# Patient Record
Sex: FEMALE | Race: BLACK OR AFRICAN AMERICAN | URBAN - METROPOLITAN AREA
[De-identification: names, ages, dates, MRNs, and addresses within clinical notes are randomized per-mention and may not be internally consistent; named-entity substitution may affect disease eponyms.]

---

## 2020-06-30 PROBLEM — N19 KIDNEY FAILURE: Status: ACTIVE | Noted: 2020-06-30

## 2020-06-30 PROBLEM — Z99.2 ESRD ON DIALYSIS (HCC): Status: ACTIVE | Noted: 2020-06-30

## 2020-06-30 PROBLEM — N17.9 AKI (ACUTE KIDNEY INJURY) (HCC): Status: ACTIVE | Noted: 2020-06-17

## 2020-06-30 PROBLEM — N18.6 ESRD ON DIALYSIS (HCC): Status: ACTIVE | Noted: 2020-06-30

## 2020-06-30 PROBLEM — I10 ESSENTIAL HYPERTENSION: Status: ACTIVE | Noted: 2020-06-30

## 2020-06-30 PROBLEM — N18.9 CHRONIC RENAL FAILURE: Status: ACTIVE | Noted: 2020-06-30

## 2020-07-02 PROBLEM — E43 SEVERE MALNUTRITION (HCC): Status: ACTIVE | Noted: 2020-07-02

## 2020-07-04 PROBLEM — R78.81 MSSA BACTEREMIA: Status: ACTIVE | Noted: 2020-07-04

## 2020-07-04 PROBLEM — B95.61 MSSA BACTEREMIA: Status: ACTIVE | Noted: 2020-07-04

## 2020-07-04 PROBLEM — M00.9 PYOGENIC ARTHRITIS OF MULTIPLE SITES (HCC): Status: ACTIVE | Noted: 2020-07-04

## 2020-08-01 PROBLEM — R10.9 INTRACTABLE ABDOMINAL PAIN: Status: ACTIVE | Noted: 2020-08-01

## 2020-08-01 PROBLEM — I31.39 PERICARDIAL EFFUSION: Status: ACTIVE | Noted: 2020-08-01

## 2020-08-01 PROBLEM — R10.84 GENERALIZED ABDOMINAL PAIN: Status: ACTIVE | Noted: 2020-08-01

## 2020-08-01 PROBLEM — E87.6 HYPOKALEMIA: Status: ACTIVE | Noted: 2020-08-01

## 2020-08-03 PROBLEM — E43 SEVERE MALNUTRITION (HCC): Status: ACTIVE | Noted: 2020-08-03

## 2020-08-03 PROBLEM — K86.89 PANCREATIC MASS: Status: ACTIVE | Noted: 2020-08-03

## 2021-03-02 PROBLEM — I16.0 HYPERTENSIVE URGENCY: Status: ACTIVE | Noted: 2021-03-02

## 2021-03-09 PROBLEM — I10 HYPERTENSION: Status: ACTIVE | Noted: 2021-03-09

## 2021-04-06 PROBLEM — R10.9 ABDOMINAL PAIN: Status: ACTIVE | Noted: 2021-04-06

## 2021-04-08 PROBLEM — R10.84 DIFFUSE ABDOMINAL PAIN: Status: ACTIVE | Noted: 2021-04-08

## 2021-04-28 PROBLEM — D64.9 ANEMIA: Status: ACTIVE | Noted: 2021-04-28

## 2021-05-08 PROBLEM — Z90.411 HISTORY OF PARTIAL PANCREATECTOMY: Status: ACTIVE | Noted: 2021-02-18

## 2021-05-08 PROBLEM — R26.2 DISABILITY OF WALKING: Status: ACTIVE | Noted: 2021-05-08

## 2021-05-08 PROBLEM — E44.1 MALNUTRITION OF MILD DEGREE (HCC): Status: ACTIVE | Noted: 2020-08-17

## 2021-05-08 PROBLEM — R18.8 INTRA-ABDOMINAL FLUID COLLECTION: Status: ACTIVE | Noted: 2021-01-29

## 2021-05-08 PROBLEM — G43.009 MIGRAINE WITHOUT AURA OR STATUS MIGRAINOSUS: Status: ACTIVE | Noted: 2021-04-03

## 2021-05-08 PROBLEM — I11.9 BENIGN HYPERTENSIVE HEART DISEASE: Status: ACTIVE | Noted: 2020-06-30

## 2021-05-08 PROBLEM — Z93.1 GASTROSTOMY PRESENT (HCC): Status: ACTIVE | Noted: 2021-05-08

## 2021-05-08 PROBLEM — R10.13 EPIGASTRIC ABDOMINAL PAIN: Status: ACTIVE | Noted: 2021-02-17

## 2021-05-08 PROBLEM — G44.209 TENSION TYPE HEADACHE: Status: ACTIVE | Noted: 2021-04-03

## 2021-05-08 PROBLEM — R06.00 DYSPNEA: Status: ACTIVE | Noted: 2021-02-23

## 2021-05-08 PROBLEM — I50.9 NEW ONSET OF CONGESTIVE HEART FAILURE (HCC): Status: ACTIVE | Noted: 2021-05-08

## 2021-05-08 PROBLEM — E88.09 PSEUDOCHOLINESTERASE DEFICIENCY: Status: ACTIVE | Noted: 2020-10-15

## 2021-05-08 PROBLEM — R62.7 FAILURE TO THRIVE IN ADULT: Status: ACTIVE | Noted: 2021-03-13

## 2021-05-08 PROBLEM — J90 PLEURAL EFFUSION: Status: ACTIVE | Noted: 2020-09-18

## 2021-05-08 PROBLEM — D57.3 SICKLE CELL TRAIT (HCC): Status: ACTIVE | Noted: 2020-09-18

## 2021-05-08 PROBLEM — G89.18 POSTOPERATIVE PAIN: Status: ACTIVE | Noted: 2021-02-07

## 2021-05-08 PROBLEM — A41.9 SEPSIS (HCC): Status: ACTIVE | Noted: 2020-09-18

## 2021-05-08 PROBLEM — R56.9 SEIZURE (HCC): Status: ACTIVE | Noted: 2021-01-28

## 2021-05-08 PROBLEM — R63.4 WEIGHT LOSS: Status: ACTIVE | Noted: 2020-09-18

## 2021-05-08 PROBLEM — K21.9 GASTROESOPHAGEAL REFLUX DISEASE: Status: ACTIVE | Noted: 2020-09-18

## 2021-05-08 PROBLEM — L02.213 ABSCESS OF CHEST WALL: Status: ACTIVE | Noted: 2020-07-20

## 2021-05-08 PROBLEM — E55.9 VITAMIN D DEFICIENCY: Status: ACTIVE | Noted: 2020-09-18

## 2021-05-08 PROBLEM — F17.200 NICOTINE USE DISORDER: Status: ACTIVE | Noted: 2021-01-12

## 2021-05-08 PROBLEM — Z99.2 DEPENDENCE ON RENAL DIALYSIS (HCC): Status: ACTIVE | Noted: 2021-05-08

## 2021-05-08 PROBLEM — Z53.29 LEFT AGAINST MEDICAL ADVICE: Status: ACTIVE | Noted: 2021-04-03

## 2021-05-08 PROBLEM — M65.142 SUPPURATIVE TENOSYNOVITIS OF FLEXOR TENDON OF LEFT HAND: Status: ACTIVE | Noted: 2020-07-20

## 2021-05-31 PROBLEM — I16.1 HYPERTENSIVE EMERGENCY: Status: ACTIVE | Noted: 2021-05-31

## 2021-10-19 PROBLEM — R78.81 POSITIVE BLOOD CULTURE: Status: ACTIVE | Noted: 2021-10-19

## 2021-11-09 PROBLEM — I82.210 SUPERIOR VENA CAVA THROMBOSIS (HCC): Status: ACTIVE | Noted: 2021-11-09

## 2021-11-21 PROBLEM — R77.8 ELEVATED TROPONIN: Status: ACTIVE | Noted: 2021-11-21

## 2021-12-17 PROBLEM — I51.3: Status: ACTIVE | Noted: 2021-12-17

## 2021-12-21 PROBLEM — R77.8 ELEVATED TROPONIN: Status: RESOLVED | Noted: 2021-11-21 | Resolved: 2021-12-21

## 2022-01-05 PROBLEM — R50.9 FEVER AND CHILLS: Status: ACTIVE | Noted: 2022-01-05

## 2022-01-17 PROBLEM — M00.9 SEPTIC ARTHRITIS (HCC): Status: ACTIVE | Noted: 2022-01-17

## 2022-06-06 PROBLEM — N17.9 ACUTE KIDNEY INJURY (HCC): Status: ACTIVE | Noted: 2022-06-06

## 2022-07-17 PROBLEM — R10.10 UPPER ABDOMINAL PAIN: Status: ACTIVE | Noted: 2022-07-17

## 2022-07-18 PROBLEM — K29.90 GASTRITIS AND DUODENITIS: Status: ACTIVE | Noted: 2022-07-18

## 2022-07-21 PROBLEM — R79.89 ELEVATED LIVER FUNCTION TESTS: Status: ACTIVE | Noted: 2022-07-21

## 2022-10-24 PROBLEM — R77.8 ELEVATED TROPONIN: Status: ACTIVE | Noted: 2022-10-24

## 2022-10-25 PROBLEM — E43 SEVERE MALNUTRITION (HCC): Chronic | Status: ACTIVE | Noted: 2022-10-25

## 2022-10-28 PROBLEM — K85.90 PANCREATITIS, UNSPECIFIED PANCREATITIS TYPE: Status: ACTIVE | Noted: 2022-10-28

## 2022-11-23 PROBLEM — R77.8 ELEVATED TROPONIN: Status: RESOLVED | Noted: 2022-10-24 | Resolved: 2022-11-23

## 2022-12-05 ENCOUNTER — APPOINTMENT (OUTPATIENT)
Dept: GENERAL RADIOLOGY | Age: 32
End: 2022-12-05
Payer: COMMERCIAL

## 2022-12-05 ENCOUNTER — HOSPITAL ENCOUNTER (EMERGENCY)
Age: 32
Discharge: HOME | End: 2022-12-05
Attending: EMERGENCY MEDICINE
Payer: COMMERCIAL

## 2022-12-05 VITALS
HEIGHT: 62 IN | TEMPERATURE: 98.3 F | RESPIRATION RATE: 17 BRPM | WEIGHT: 103 LBS | OXYGEN SATURATION: 97 % | BODY MASS INDEX: 18.95 KG/M2 | HEART RATE: 96 BPM | DIASTOLIC BLOOD PRESSURE: 106 MMHG | SYSTOLIC BLOOD PRESSURE: 161 MMHG

## 2022-12-05 DIAGNOSIS — D57.00 SICKLE CELL PAIN CRISIS (HCC): Primary | ICD-10-CM

## 2022-12-05 LAB
ABSOLUTE EOS #: 0.27 K/UL (ref 0–0.4)
ABSOLUTE LYMPH #: 0.82 K/UL (ref 1–4.8)
ABSOLUTE MONO #: 0.69 K/UL (ref 0.1–1.3)
ALBUMIN SERPL-MCNC: 4 G/DL (ref 3.5–5.2)
ALP BLD-CCNC: 161 U/L (ref 35–104)
ALT SERPL-CCNC: 8 U/L (ref 5–33)
ANION GAP SERPL CALCULATED.3IONS-SCNC: 15 MMOL/L (ref 9–17)
AST SERPL-CCNC: 16 U/L
BASOPHILS # BLD: 0 % (ref 0–2)
BASOPHILS ABSOLUTE: 0 K/UL (ref 0–0.2)
BILIRUB SERPL-MCNC: 0.2 MG/DL (ref 0.3–1.2)
BUN BLDV-MCNC: 50 MG/DL (ref 6–20)
CALCIUM SERPL-MCNC: 8.3 MG/DL (ref 8.6–10.4)
CHLORIDE BLD-SCNC: 104 MMOL/L (ref 98–107)
CO2: 15 MMOL/L (ref 20–31)
CREAT SERPL-MCNC: 2.5 MG/DL (ref 0.5–0.9)
EOSINOPHILS RELATIVE PERCENT: 2 % (ref 0–4)
GFR SERPL CREATININE-BSD FRML MDRD: 26 ML/MIN/1.73M2
GLUCOSE BLD-MCNC: 122 MG/DL (ref 70–99)
HCG QUALITATIVE: NEGATIVE
HCT VFR BLD CALC: 38.8 % (ref 36–46)
HEMOGLOBIN: 12.8 G/DL (ref 12–16)
LYMPHOCYTES # BLD: 6 % (ref 24–44)
MAGNESIUM: 1.9 MG/DL (ref 1.6–2.6)
MCH RBC QN AUTO: 28.2 PG (ref 26–34)
MCHC RBC AUTO-ENTMCNC: 33 G/DL (ref 31–37)
MCV RBC AUTO: 85.4 FL (ref 80–100)
MONOCYTES # BLD: 5 % (ref 1–7)
MORPHOLOGY: ABNORMAL
PDW BLD-RTO: 16.9 % (ref 11.5–14.9)
PLATELET # BLD: 584 K/UL (ref 150–450)
PMV BLD AUTO: 8.4 FL (ref 6–12)
POTASSIUM SERPL-SCNC: 4.7 MMOL/L (ref 3.7–5.3)
RBC # BLD: 4.54 M/UL (ref 4–5.2)
REASON FOR REJECTION: NORMAL
SEG NEUTROPHILS: 87 % (ref 36–66)
SEGMENTED NEUTROPHILS ABSOLUTE COUNT: 11.92 K/UL (ref 1.3–9.1)
SODIUM BLD-SCNC: 134 MMOL/L (ref 135–144)
TOTAL PROTEIN: 8.1 G/DL (ref 6.4–8.3)
TROPONIN, HIGH SENSITIVITY: 43 NG/L (ref 0–14)
TROPONIN, HIGH SENSITIVITY: 51 NG/L (ref 0–14)
WBC # BLD: 13.7 K/UL (ref 3.5–11)
ZZ NTE CLEAN UP: ORDERED TEST: NORMAL
ZZ NTE WITH NAME CLEAN UP: SPECIMEN SOURCE: NORMAL

## 2022-12-05 PROCEDURE — 6370000000 HC RX 637 (ALT 250 FOR IP): Performed by: EMERGENCY MEDICINE

## 2022-12-05 PROCEDURE — 84484 ASSAY OF TROPONIN QUANT: CPT

## 2022-12-05 PROCEDURE — 96376 TX/PRO/DX INJ SAME DRUG ADON: CPT

## 2022-12-05 PROCEDURE — 36415 COLL VENOUS BLD VENIPUNCTURE: CPT

## 2022-12-05 PROCEDURE — 71045 X-RAY EXAM CHEST 1 VIEW: CPT

## 2022-12-05 PROCEDURE — 84703 CHORIONIC GONADOTROPIN ASSAY: CPT

## 2022-12-05 PROCEDURE — 93005 ELECTROCARDIOGRAM TRACING: CPT | Performed by: EMERGENCY MEDICINE

## 2022-12-05 PROCEDURE — 99285 EMERGENCY DEPT VISIT HI MDM: CPT

## 2022-12-05 PROCEDURE — 80053 COMPREHEN METABOLIC PANEL: CPT

## 2022-12-05 PROCEDURE — 83735 ASSAY OF MAGNESIUM: CPT

## 2022-12-05 PROCEDURE — 6360000002 HC RX W HCPCS: Performed by: EMERGENCY MEDICINE

## 2022-12-05 PROCEDURE — 96361 HYDRATE IV INFUSION ADD-ON: CPT

## 2022-12-05 PROCEDURE — 2580000003 HC RX 258: Performed by: EMERGENCY MEDICINE

## 2022-12-05 PROCEDURE — 85025 COMPLETE CBC W/AUTO DIFF WBC: CPT

## 2022-12-05 PROCEDURE — 96374 THER/PROPH/DIAG INJ IV PUSH: CPT

## 2022-12-05 RX ORDER — DIPHENHYDRAMINE HCL 25 MG
50 TABLET ORAL ONCE
Status: COMPLETED | OUTPATIENT
Start: 2022-12-05 | End: 2022-12-05

## 2022-12-05 RX ORDER — 0.9 % SODIUM CHLORIDE 0.9 %
1000 INTRAVENOUS SOLUTION INTRAVENOUS ONCE
Status: COMPLETED | OUTPATIENT
Start: 2022-12-05 | End: 2022-12-05

## 2022-12-05 RX ADMIN — HYDROMORPHONE HYDROCHLORIDE 1 MG: 1 INJECTION, SOLUTION INTRAMUSCULAR; INTRAVENOUS; SUBCUTANEOUS at 15:49

## 2022-12-05 RX ADMIN — HYDROMORPHONE HYDROCHLORIDE 1 MG: 1 INJECTION, SOLUTION INTRAMUSCULAR; INTRAVENOUS; SUBCUTANEOUS at 13:35

## 2022-12-05 RX ADMIN — SODIUM CHLORIDE 1000 ML: 9 INJECTION, SOLUTION INTRAVENOUS at 15:49

## 2022-12-05 RX ADMIN — SODIUM CHLORIDE 1000 ML: 9 INJECTION, SOLUTION INTRAVENOUS at 13:41

## 2022-12-05 RX ADMIN — DIPHENHYDRAMINE HYDROCHLORIDE 50 MG: 25 TABLET ORAL at 13:38

## 2022-12-05 ASSESSMENT — PAIN DESCRIPTION - PAIN TYPE: TYPE: ACUTE PAIN

## 2022-12-05 ASSESSMENT — PAIN SCALES - GENERAL
PAINLEVEL_OUTOF10: 0
PAINLEVEL_OUTOF10: 9
PAINLEVEL_OUTOF10: 8
PAINLEVEL_OUTOF10: 10
PAINLEVEL_OUTOF10: 10

## 2022-12-05 ASSESSMENT — PAIN DESCRIPTION - LOCATION
LOCATION: GENERALIZED
LOCATION: GENERALIZED

## 2022-12-05 ASSESSMENT — ENCOUNTER SYMPTOMS
COUGH: 0
VOMITING: 0
NAUSEA: 0

## 2022-12-05 ASSESSMENT — PAIN - FUNCTIONAL ASSESSMENT: PAIN_FUNCTIONAL_ASSESSMENT: 0-10

## 2022-12-05 NOTE — ED TRIAGE NOTES
Mode of arrival (squad #, walk in, police, etc) : walk-in        Chief complaint(s): sickle cell pain crisis        Arrival Note (brief scenario, treatment PTA, etc). : Pt reports being in sickle cell pain crisis x2 hours. Pt reports pain over entire body. C= \"Have you ever felt that you should Cut down on your drinking? \"  No  A= \"Have people Annoyed you by criticizing your drinking? \"  No  G= \"Have you ever felt bad or Guilty about your drinking? \"  No  E= \"Have you ever had a drink as an Eye-opener first thing in the morning to steady your nerves or to help a hangover? \"  No      Deferred []      Reason for deferring: N/A    *If yes to two or more: probable alcohol abuse. *

## 2022-12-05 NOTE — ED PROVIDER NOTES
[address]  EMERGENCY DEPARTMENT ENCOUNTER      Pt Name: Nalani Ganser  MRN: [id number]  [place] 1990  Date of evaluation: 12/5/22      CHIEF COMPLAINT       Chief Complaint   Patient presents with    Sickle Cell Pain Crisis     HISTORY OF PRESENT ILLNESS   HPI 33 y.o. female presents with c/o body pain. The patient reports that she was outside today doing a driving test required for applying for a job. She says that she was really cold and then began having diffuse body and muscle pain. Her she says her fingers were turning white and very painful. She came to the emergency department. Pain is severe in severity, constant in course, present throughout the day. Patient has sickle cell disease, she also has chronic kidney disease, she says that she was taken off of dialysis and she has been urinating normally. She denies any cough runny nose or shortness of breath. She does report chest pain across her bilateral chest.  No vomiting no diarrhea. REVIEW OF SYSTEMS       Review of Systems   Constitutional:  Negative for chills and fever. HENT:  Negative for congestion. Eyes:  Negative for visual disturbance. Respiratory:  Negative for cough. Cardiovascular:  Positive for chest pain. Gastrointestinal:  Negative for nausea and vomiting. Genitourinary:  Negative for difficulty urinating. Musculoskeletal:  Positive for arthralgias and myalgias. Skin:  Negative for rash. Neurological:  Negative for headaches. Psychiatric/Behavioral:  Negative for confusion.       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    Hypertension     Pleural effusion     PNA (pneumonia)     Sickle cell trait ([facility] [address]        SURGICAL HISTORY       Past Surgical History:   Procedure Laterality Date    ENDOSCOPY, COLON, DIAGNOSTIC 2020    ENDOSCOPY, COLON, DIAGNOSTIC  2022    HAND SURGERY      Left hand due to infection    PANCREAS SURGERY      SHOULDER SURGERY      Left back shoulder due to infection    SPLENECTOMY         CURRENT MEDICATIONS       Previous Medications    CARVEDILOL (COREG) 25 MG TABLET    Take 25 mg by mouth 2 times daily (with meals)    CLONIDINE (CATAPRES) 0.3 MG TABLET    Take 0.2 mg by mouth in the morning and 0.2 mg at noon and 0.2 mg in the evening. 0.2 mg every 8 hrs. HYOSCYAMINE (ANASPAZ;LEVSIN) 125 MCG TABLET    Take 125 mcg by mouth every 8 hours as needed for Cramping    ISOSORBIDE MONONITRATE (IMDUR) 60 MG EXTENDED RELEASE TABLET    Take 60 mg by mouth in the morning. LACTOBACILLUS (ACIDOPHILUS) CAPS CAPSULE    Take 1 capsule by mouth daily    METOCLOPRAMIDE (REGLAN) 5 MG TABLET    Take 5 mg by mouth 4 times daily as needed (headache or nausea)    NIFEDIPINE (PROCARDIA XL) 90 MG EXTENDED RELEASE TABLET    Take 90 mg by mouth 2 times daily     PANTOPRAZOLE (PROTONIX) 40 MG TABLET    Take 40 mg by mouth 2 times daily       ALLERGIES     is allergic to succinylcholine, amino acids, aminosyn, anidulafungin, cefazolin, chlorhexidine, lisinopril, morphine, and vancomycin. FAMILY HISTORY     She indicated that the status of her neg hx is unknown. SOCIAL HISTORY      reports that she has been smoking cigarettes. She has been smoking an average of .5 packs per day. She has never used smokeless tobacco. She reports that she does not drink alcohol and does not use drugs.     PHYSICAL EXAM     INITIAL VITALS: BP (!) 182/118   Pulse (!) 112   Temp 98.3 °F (36.8 °C) (Oral)   Resp 29   Ht 5' 2\" (1.575 m)   Wt 103 lb (46.7 kg)   SpO2 99%   BMI 18.84 kg/m²   Gen: Crying, tearful, rocking back and forth in bed  Head: Normocephalic, atraumatic  Eye: Pupils equal round reactive to light, no conjunctivitis  ENT: MMM  Neck: No JVD  Heart: Tachycardic with a regular rhythm no murmurs  Lungs: Clear to auscultation bilaterally, no respiratory distress  Chest wall: No crepitus, no tenderness palpation  Abdomen: Soft, nontender, nondistended, with no peritoneal signs  Neurologic: Patient is alert and oriented x3, motor and sensation is intact in all 4 extremities, fluent speech  Extremities: No edema, appropriate capillary refill    MEDICAL DECISION MAKING:     [facility]  And Emergency Department course  33 y.o. female with sickle cell disease presenting with acute muscle aches and body pain. Suspect a sickle cell pain crisis secondary to cold exposure. The patient's hemoglobin was checked and there is no acute anemia. Chest x-ray was obtained and shows no sign of any pulmonary infiltrates and so I doubt that she has acute chest syndrome. Given her chest pain we ruled out acute coronary syndrome, she does have a mild troponin elevation but this is not significantly elevating, I reviewed her outside medical records and it appears she has a chronic troponin elevation. The patient has chronic kidney disease and her potassium is not elevated. Patient was reassessed after warm IV fluids and analgesics. She says that she is feeling better. She is ready to go home. D/w pt the results, treatment plan, warning precautions for prompt ED return and importance of close OP FU, she verbalizes understanding and agrees with the treatment plan. DIAGNOSTIC RESULTS     EKG: All EKG's are interpreted by the Emergency Department Physician who either signs or Co-signs this chart in the absence of a cardiologist.    EKG shows a sinus tachycardia rhythm. HR is 101, , QRS 76, , no MURIEL, No STD, TWI, the axis is normal.      RADIOLOGY:All plain film, CT, MRI, and formal ultrasound images (except ED bedside ultrasound) are read by the radiologist and the images and interpretations are directly viewed by the emergency physician. XR CHEST PORTABLE   Final Result   Cardiomegaly without acute pulmonary process.              LABS: All lab results were reviewed by myself, and all abnormals are listed below. Labs Reviewed   CBC WITH AUTO DIFFERENTIAL - Abnormal; Notable for the following components:       Result Value    WBC 13.7 (*)     RDW 16.9 (*)     Platelets 399 (*)     Seg Neutrophils 87 (*)     Lymphocytes 6 (*)     Segs Absolute 11.92 (*)     Absolute Lymph # 0.82 (*)     All other components within normal limits   TROPONIN - Abnormal; Notable for the following components:    Troponin, High Sensitivity 51 (*)     All other components within normal limits   COMPREHENSIVE METABOLIC PANEL - Abnormal; Notable for the following components:    Glucose 122 (*)     BUN 50 (*)     Creatinine 2.50 (*)     Est, Glom Filt Rate 26 (*)     Calcium 8.3 (*)     Sodium 134 (*)     CO2 15 (*)     Alkaline Phosphatase 161 (*)     Total Bilirubin 0.2 (*)     All other components within normal limits   TROPONIN - Abnormal; Notable for the following components:    Troponin, High Sensitivity 43 (*)     All other components within normal limits   HCG, SERUM, QUALITATIVE   SPECIMEN REJECTION   MAGNESIUM       EMERGENCY DEPARTMENT COURSE:   Vitals:    Vitals:    12/05/22 1347 12/05/22 1352 12/05/22 1430 12/05/22 1530   BP: (!) 182/121  (!) 177/143 (!) 182/118   Pulse: (!) 121 (!) 117 (!) 118 (!) 112   Resp: 25 19 28 29   Temp:       TempSrc:       SpO2: 97% 99% 100% 99%   Weight:       Height:           The patient was given the following medications while in the emergency department:  Orders Placed This Encounter   Medications    0.9 % sodium chloride bolus    HYDROmorphone (DILAUDID) injection 1 mg    diphenhydrAMINE (BENADRYL) tablet 50 mg    HYDROmorphone (DILAUDID) injection 1 mg    0.9 % sodium chloride bolus     -------------------------  CRITICAL CARE:   CONSULTS: None  PROCEDURES: Procedures     FINAL IMPRESSION      1.  Sickle cell pain crisis [facility])          DISPOSITION/PLAN   DISPOSITION Decision To Discharge 12/05/2022 04:04:41 PM      PATIENT REFERRED TO:  Nu Gonzales MD  [address] [place] [address]  [phone]    In 2 days      [facility] ED  Critical access hospital [facility] [address]  [address] [facility]  [phone]    If symptoms worsen      DISCHARGE MEDICATIONS:  New Prescriptions    No medications on file         Torri Sanchez MD  Attending Emergency Physician                      Torri Sanchez MD  12/05/22 0787

## 2022-12-06 LAB
EKG ATRIAL RATE: 101 BPM
EKG P AXIS: 58 DEGREES
EKG P-R INTERVAL: 124 MS
EKG Q-T INTERVAL: 362 MS
EKG QRS DURATION: 76 MS
EKG QTC CALCULATION (BAZETT): 469 MS
EKG R AXIS: -26 DEGREES
EKG T AXIS: 87 DEGREES
EKG VENTRICULAR RATE: 101 BPM

## 2022-12-06 PROCEDURE — 93010 ELECTROCARDIOGRAM REPORT: CPT | Performed by: INTERNAL MEDICINE
